# Patient Record
Sex: FEMALE | Race: WHITE | URBAN - METROPOLITAN AREA
[De-identification: names, ages, dates, MRNs, and addresses within clinical notes are randomized per-mention and may not be internally consistent; named-entity substitution may affect disease eponyms.]

---
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
## 2023-01-20 ENCOUNTER — APPOINTMENT (OUTPATIENT)
Dept: GENERAL RADIOLOGY | Age: 18
End: 2023-01-20
Payer: COMMERCIAL

## 2023-01-20 ENCOUNTER — HOSPITAL ENCOUNTER (EMERGENCY)
Age: 18
Discharge: HOME | End: 2023-01-20
Attending: EMERGENCY MEDICINE
Payer: COMMERCIAL

## 2023-01-20 VITALS
SYSTOLIC BLOOD PRESSURE: 109 MMHG | HEART RATE: 92 BPM | WEIGHT: 162.48 LBS | RESPIRATION RATE: 16 BRPM | DIASTOLIC BLOOD PRESSURE: 66 MMHG | OXYGEN SATURATION: 97 % | TEMPERATURE: 98.1 F

## 2023-01-20 DIAGNOSIS — R00.2 PALPITATIONS: ICD-10-CM

## 2023-01-20 LAB
A/G RATIO: 1.9 (ref 1.1–2.2)
ALBUMIN SERPL-MCNC: 4.4 G/DL (ref 3.4–5)
ALP BLD-CCNC: 69 U/L (ref 40–129)
ALT SERPL-CCNC: <5 U/L (ref 10–40)
ANION GAP SERPL CALCULATED.3IONS-SCNC: 11 MMOL/L (ref 3–16)
AST SERPL-CCNC: 13 U/L (ref 15–37)
BASOPHILS ABSOLUTE: 0.1 K/UL (ref 0–0.2)
BASOPHILS RELATIVE PERCENT: 0.8 %
BILIRUB SERPL-MCNC: 0.3 MG/DL (ref 0–1)
BUN BLDV-MCNC: 10 MG/DL (ref 7–20)
CALCIUM SERPL-MCNC: 9.5 MG/DL (ref 8.3–10.6)
CHLORIDE BLD-SCNC: 108 MMOL/L (ref 99–110)
CO2: 21 MMOL/L (ref 21–32)
CREAT SERPL-MCNC: 0.6 MG/DL (ref 0.6–1.1)
D DIMER: <0.27 UG/ML FEU (ref 0–0.6)
EKG ATRIAL RATE: 91 BPM
EKG DIAGNOSIS: NORMAL
EKG P AXIS: 58 DEGREES
EKG P-R INTERVAL: 132 MS
EKG Q-T INTERVAL: 364 MS
EKG QRS DURATION: 98 MS
EKG QTC CALCULATION (BAZETT): 447 MS
EKG R AXIS: 92 DEGREES
EKG T AXIS: 53 DEGREES
EKG VENTRICULAR RATE: 91 BPM
EOSINOPHILS ABSOLUTE: 0.1 K/UL (ref 0–0.6)
EOSINOPHILS RELATIVE PERCENT: 1.9 %
GFR SERPL CREATININE-BSD FRML MDRD: >60 ML/MIN/{1.73_M2}
GLUCOSE BLD-MCNC: 102 MG/DL (ref 70–99)
HCG QUALITATIVE: NEGATIVE
HCT VFR BLD CALC: 39.9 % (ref 36–48)
HEMOGLOBIN: 13.5 G/DL (ref 12–16)
LYMPHOCYTES ABSOLUTE: 1.8 K/UL (ref 1–5.1)
LYMPHOCYTES RELATIVE PERCENT: 28.4 %
MCH RBC QN AUTO: 29.7 PG (ref 26–34)
MCHC RBC AUTO-ENTMCNC: 33.9 G/DL (ref 31–36)
MCV RBC AUTO: 87.8 FL (ref 80–100)
MONOCYTES ABSOLUTE: 0.5 K/UL (ref 0–1.3)
MONOCYTES RELATIVE PERCENT: 7.6 %
NEUTROPHILS ABSOLUTE: 3.8 K/UL (ref 1.7–7.7)
NEUTROPHILS RELATIVE PERCENT: 61.3 %
PDW BLD-RTO: 13.2 % (ref 12.4–15.4)
PLATELET # BLD: 251 K/UL (ref 135–450)
PMV BLD AUTO: 8.9 FL (ref 5–10.5)
POTASSIUM REFLEX MAGNESIUM: 3.6 MMOL/L (ref 3.5–5.1)
RBC # BLD: 4.55 M/UL (ref 4–5.2)
SODIUM BLD-SCNC: 140 MMOL/L (ref 136–145)
TOTAL PROTEIN: 6.7 G/DL (ref 6.4–8.2)
TROPONIN: <0.01 NG/ML
WBC # BLD: 6.2 K/UL (ref 4–11)

## 2023-01-20 PROCEDURE — 80053 COMPREHEN METABOLIC PANEL: CPT

## 2023-01-20 PROCEDURE — 36415 COLL VENOUS BLD VENIPUNCTURE: CPT

## 2023-01-20 PROCEDURE — 84703 CHORIONIC GONADOTROPIN ASSAY: CPT

## 2023-01-20 PROCEDURE — 93005 ELECTROCARDIOGRAM TRACING: CPT | Performed by: EMERGENCY MEDICINE

## 2023-01-20 PROCEDURE — 85025 COMPLETE CBC W/AUTO DIFF WBC: CPT

## 2023-01-20 PROCEDURE — 85379 FIBRIN DEGRADATION QUANT: CPT

## 2023-01-20 PROCEDURE — 99285 EMERGENCY DEPT VISIT HI MDM: CPT

## 2023-01-20 PROCEDURE — 84484 ASSAY OF TROPONIN QUANT: CPT

## 2023-01-20 PROCEDURE — 71045 X-RAY EXAM CHEST 1 VIEW: CPT

## 2023-01-20 PROCEDURE — 93010 ELECTROCARDIOGRAM REPORT: CPT | Performed by: INTERNAL MEDICINE

## 2023-01-20 NOTE — ED NOTES
Patient presents to ED with \"palpitations\" and states \"I feel like my heart is murmuring. \" No dx of heart murmur. Patient does have a HX of anxiety but states she hasn't been on any medications recently for it. She states she called 911 and the paramedics  Thought she was having a panic attack. She is alert and oriented 4, vitals are WNL, airway is patent. She denies any new stressors, or thoughts of harming herself or others. Mom is at the bedside. Denies intake of excess caffeine.       Sherice Zavala RN  01/20/23 7031  [address]  01/20/23 2744

## 2023-01-20 NOTE — ED PROVIDER NOTES
[id number] [facility]      CHIEF COMPLAINT  Palpitations       HISTORY OF PRESENT ILLNESS  Diego Jama is a 21 y.o. female  who presents to the ED complaining of palpitations. Patient states that she is laying when this starts and feels as if her heart is \"starting\" and she has a sinking feeling currently in her chest.  She states it almost felt like she had a chest cramp at 1 point. She does feel somewhat short of breath with it. She has not had any syncope. No leg pain or swelling. She states that is been ongoing for probably several hours which concerned her. She had a similar episode a few months ago that only lasted for about an hour or so and she was lying at that time as well. She states that she feels as if her entire body shivers in her hands and feet felt very cold with that. She denies any known fevers or recent illness. No cough. No diarrhea. She does not drink any caffeine. She does smoke marijuana but did not smoke any marijuana this morning before the onset of the symptoms. No other drug abuse. She is not on an OCP. No recent travel or surgeries. She is here with her grandmother who is at bedside and provides some additional history. Grandmother states that her father did have cardiovascular disease she believes possibly in her 46s but is unsure. Otherwise, no significant family history of cardiac disease, specifically no cardiac issues at a young age that they are aware of. No other complaints, modifying factors or associated symptoms. I have reviewed the following from the nursing documentation. Past Medical History:   Diagnosis Date    Anxiety     Depression     MRSA (methicillin resistant staph aureus) culture positive 01/14/2020    abscess     No past surgical history on file. No family history on file.   Social History     Socioeconomic History    Marital status: Single     Spouse name: Not on file    Number of children: Not on file    Years of education: Not on file    Highest education level: Not on file   Occupational History    Not on file   Tobacco Use    Smoking status: Never    Smokeless tobacco: Never   Substance and Sexual Activity    Alcohol use: Never    Drug use: Yes     Types: Marijuana Veldon Breath)    Sexual activity: Not on file   Other Topics Concern    Not on file   Social History Narrative    Not on file     Social Determinants of Health     Financial Resource Strain: Not on file   Food Insecurity: Not on file   Transportation Needs: Not on file   Physical Activity: Not on file   Stress: Not on file   Social Connections: Not on file   Intimate Partner Violence: Not on file   Housing Stability: Not on file     No current facility-administered medications for this encounter. Current Outpatient Medications   Medication Sig Dispense Refill    cloNIDine (CATAPRES) 0.1 MG tablet       sertraline (ZOLOFT) 25 MG tablet TK 3 TS PO D      ibuprofen (ADVIL;MOTRIN) 400 MG tablet Take 1 tablet by mouth every 6 hours as needed for Pain 40 tablet 0     No Known Allergies    PHYSICAL EXAM  /66   Pulse 92   Temp 98.1 °F (36.7 °C)   Resp 16   Wt 162 lb 7.7 oz (73.7 kg)   SpO2 97%    GENERAL APPEARANCE: Awake and alert. Cooperative. No acute distress. HENT: Normocephalic. Atraumatic. Mucous membranes are moist.  No drooling or stridor. NECK: Supple. No JVD. EYES: PERRL. EOM's grossly intact. HEART/CHEST: RRR. No murmurs. 2+ radial pulses bilaterally. LUNGS: Respirations unlabored. CTAB. No wheezes rales or rhonchi. Good air exchange. Speaking comfortably in full sentences. ABDOMEN: No tenderness. Soft. Non-distended. No masses. No organomegaly. No guarding or rebound. MUSCULOSKELETAL: No extremity edema. No calf tenderness bilaterally. Compartments soft. No deformity. No tenderness in the extremities. All extremities neurovascularly intact. SKIN: Warm and dry. No acute rashes. NEUROLOGICAL: Alert and oriented.  CN's 2-12 intact. No gross facial drooping. No gross focal deficits. PSYCHIATRIC: Somewhat anxious appearing. LABS  I have reviewed all labs for this visit.    Results for orders placed or performed during the hospital encounter of 01/20/23   D-Dimer, Quantitative   Result Value Ref Range    D-Dimer, Quant <0.27 0.00 - 0.60 ug/mL FEU   CMP w/ Reflex to MG   Result Value Ref Range    Sodium 140 136 - 145 mmol/L    Potassium reflex Magnesium 3.6 3.5 - 5.1 mmol/L    Chloride 108 99 - 110 mmol/L    CO2 21 21 - 32 mmol/L    Anion Gap 11 3 - 16    Glucose 102 (H) 70 - 99 mg/dL    BUN 10 7 - 20 mg/dL    Creatinine 0.6 0.6 - 1.1 mg/dL    Est, Glom Filt Rate >60 >60    Calcium 9.5 8.3 - 10.6 mg/dL    Total Protein 6.7 6.4 - 8.2 g/dL    Albumin 4.4 3.4 - 5.0 g/dL    Albumin/Globulin Ratio 1.9 1.1 - 2.2    Total Bilirubin 0.3 0.0 - 1.0 mg/dL    Alkaline Phosphatase 69 40 - 129 U/L    ALT <5 (L) 10 - 40 U/L    AST 13 (L) 15 - 37 U/L   CBC with Auto Differential   Result Value Ref Range    WBC 6.2 4.0 - 11.0 K/uL    RBC 4.55 4.00 - 5.20 M/uL    Hemoglobin 13.5 12.0 - 16.0 g/dL    Hematocrit 39.9 36.0 - 48.0 %    MCV 87.8 80.0 - 100.0 fL    MCH 29.7 26.0 - 34.0 pg    MCHC 33.9 31.0 - 36.0 g/dL    RDW 13.2 12.4 - 15.4 %    Platelets 126 334 - 179 K/uL    MPV 8.9 5.0 - 10.5 fL    Neutrophils % 61.3 %    Lymphocytes % 28.4 %    Monocytes % 7.6 %    Eosinophils % 1.9 %    Basophils % 0.8 %    Neutrophils Absolute 3.8 1.7 - 7.7 K/uL    Lymphocytes Absolute 1.8 1.0 - 5.1 K/uL    Monocytes Absolute 0.5 0.0 - 1.3 K/uL    Eosinophils Absolute 0.1 0.0 - 0.6 K/uL    Basophils Absolute 0.1 0.0 - 0.2 K/uL   Troponin   Result Value Ref Range    Troponin <0.01 <0.01 ng/mL   HCG Qualitative, Serum   Result Value Ref Range    hCG Qual Negative Detects HCG level >10 MIU/mL   EKG 12 Lead   Result Value Ref Range    Ventricular Rate 91 BPM    Atrial Rate 91 BPM    P-R Interval 132 ms    QRS Duration 98 ms    Q-T Interval 364 ms    QTc Calculation (Bazett) 447 ms    P Axis 58 degrees    R Axis 92 degrees    T Axis 53 degrees    Diagnosis       Normal sinus rhythm with sinus arrhythmiaRightward axisIncomplete right bundle branch blockBorderline ECGNo previous ECGs availableConfirmed by DESIRAE MOREAU, Cintia Nicole ([id number]) on 1/20/2023 1:43:27 PM       ECG  The Ekg interpreted by me shows  normal sinus rhythm with a rate of 91 with sinus arrhythmia  Axis is   Right axis deviation  QTc is  normal  Intervals and Durations are unremarkable. ST Segments: nonspecific changes. Incomplete RBBB. No prior EKG available for comparison. RADIOLOGY  XR CHEST PORTABLE    Result Date: 1/20/2023  EXAMINATION: ONE XRAY VIEW OF THE CHEST 1/20/2023 1:02 pm COMPARISON: None. HISTORY: ORDERING SYSTEM PROVIDED HISTORY: palpitations TECHNOLOGIST PROVIDED HISTORY: Reason for exam:->palpitations Reason for Exam: heart palpitations \"heart flutters\" FINDINGS: Normal cardiomediastinal silhouette no acute airspace infiltrate. No pneumothorax or pleural effusion     No acute cardiopulmonary findings        ED COURSE/MDM  Patient presenting for evaluation of palpitations. She does have sinus arrhythmia noted on EKG as well as a an incomplete right bundle branch block and unfortunately no previous EKG for comparison. I did consider possible PE as a cause and felt that she was low pretest probability for PE given lack of risk factors. D-dimer was obtained and negative. I considered ACS as an etiology but again, felt that this was much less likely especially given her age and lack of risk factors. EKG showed no acute ischemic changes and troponin negative. Her electrolytes are unremarkable. Pregnancy was negative. At this time, I do not feel that there is any evidence of acute life-threatening cause to her symptoms.   I felt that is reasonable to discharge patient home with outpatient follow-up and cardiology referral.  She felt comfortable with that plan, as well as her grandmother who is at bedside. Reasons to return to the ER were discussed at length and all questions answered at time of discharge. I have personally reviewed Xray and Ultrasound images and radiology confirms the interpretation:  No pneumothorax, cardiomegaly, confluent infiltrate or significant pleural effusion noted. Sepsis:  Is this patient to be included in the SEP-1 Core Measure due to severe sepsis or septic shock? No   Exclusion criteria - the patient is NOT to be included for SEP-1 Core Measure due to: Infection is not suspected     Labs and imaging reviewed and results discussed with patient. Patient was reassessed as noted above . Plan of care discussed with patient. Patient in agreement with plan. Strict return precautions have been given. I, Dr. Seamus Tamez MD, am the primary clinician of record. During the patient's ED course, the patient was given:  Medications - No data to display     CLINICAL IMPRESSION  1. Palpitations        Blood pressure 109/66, pulse 92, temperature 98.1 °F (36.7 °C), resp. rate 16, weight 162 lb 7.7 oz (73.7 kg), SpO2 97 %. Ronnie Posada was discharged to home in stable condition. Patient was given scripts for the following medications. I counseled patient how to take these medications. New Prescriptions    No medications on file       Follow-up with:  *[employer]    Schedule an appointment as soon as possible for a visit in 2 days  For recheck    [facility] CARDIOLOGIST  [address] [facility] [place] [address]  [phone]  Schedule an appointment as soon as possible for a visit   to follow up with cardiology    [facility], [address] [place] [address]  [phone]        DISCLAIMER: This chart was created using Dragon dictation software.   Efforts were made by me to ensure accuracy, however some errors may be present due to limitations of this technology and occasionally words are not transcribed correctly.        Hellen Leventhal, MD  01/20/23 [id number]

## 2023-01-20 NOTE — Clinical Note
Antony Hunter was seen and treated in our emergency department on 1/20/2023. She may return to work on 01/23/2023. If you have any questions or concerns, please don't hesitate to call.       Hellen Leventhal, MD

## 2023-01-24 ENCOUNTER — HOSPITAL ENCOUNTER (EMERGENCY)
Age: 18
Discharge: HOME | End: 2023-01-24
Attending: EMERGENCY MEDICINE
Payer: COMMERCIAL

## 2023-01-24 VITALS
SYSTOLIC BLOOD PRESSURE: 120 MMHG | RESPIRATION RATE: 21 BRPM | WEIGHT: 161.2 LBS | DIASTOLIC BLOOD PRESSURE: 87 MMHG | TEMPERATURE: 97.8 F | OXYGEN SATURATION: 95 % | HEIGHT: 67 IN | HEART RATE: 88 BPM | BODY MASS INDEX: 25.3 KG/M2

## 2023-01-24 VITALS
OXYGEN SATURATION: 99 % | RESPIRATION RATE: 15 BRPM | TEMPERATURE: 98.3 F | HEART RATE: 85 BPM | SYSTOLIC BLOOD PRESSURE: 107 MMHG | WEIGHT: 155.2 LBS | BODY MASS INDEX: 24.36 KG/M2 | DIASTOLIC BLOOD PRESSURE: 68 MMHG | HEIGHT: 67 IN

## 2023-01-24 DIAGNOSIS — R00.2 PALPITATIONS: ICD-10-CM

## 2023-01-24 DIAGNOSIS — F41.1 ANXIETY STATE: Primary | ICD-10-CM

## 2023-01-24 DIAGNOSIS — R07.9 CHEST PAIN, UNSPECIFIED TYPE: Primary | ICD-10-CM

## 2023-01-24 LAB
BILIRUBIN URINE: ABNORMAL
BLOOD, URINE: NEGATIVE
CLARITY: CLEAR
COLOR: YELLOW
EKG ATRIAL RATE: 89 BPM
EKG ATRIAL RATE: 91 BPM
EKG DIAGNOSIS: NORMAL
EKG DIAGNOSIS: NORMAL
EKG P AXIS: 54 DEGREES
EKG P AXIS: 63 DEGREES
EKG P-R INTERVAL: 136 MS
EKG P-R INTERVAL: 142 MS
EKG Q-T INTERVAL: 360 MS
EKG Q-T INTERVAL: 368 MS
EKG QRS DURATION: 100 MS
EKG QRS DURATION: 98 MS
EKG QTC CALCULATION (BAZETT): 442 MS
EKG QTC CALCULATION (BAZETT): 447 MS
EKG R AXIS: 102 DEGREES
EKG R AXIS: 90 DEGREES
EKG T AXIS: 48 DEGREES
EKG T AXIS: 49 DEGREES
EKG VENTRICULAR RATE: 89 BPM
EKG VENTRICULAR RATE: 91 BPM
GLUCOSE URINE: NEGATIVE MG/DL
HCG(URINE) PREGNANCY TEST: NEGATIVE
KETONES, URINE: 15 MG/DL
LEUKOCYTE ESTERASE, URINE: NEGATIVE
MICROSCOPIC EXAMINATION: ABNORMAL
NITRITE, URINE: NEGATIVE
PH UA: 6 (ref 5–8)
PROTEIN UA: NEGATIVE MG/DL
RAPID INFLUENZA  B AGN: NEGATIVE
RAPID INFLUENZA A AGN: NEGATIVE
SARS-COV-2, NAAT: NOT DETECTED
SPECIFIC GRAVITY UA: 1.02 (ref 1–1.03)
TROPONIN: <0.01 NG/ML
URINE REFLEX TO CULTURE: ABNORMAL
URINE TYPE: ABNORMAL
UROBILINOGEN, URINE: 0.2 E.U./DL

## 2023-01-24 PROCEDURE — 6370000000 HC RX 637 (ALT 250 FOR IP): Performed by: EMERGENCY MEDICINE

## 2023-01-24 PROCEDURE — 93010 ELECTROCARDIOGRAM REPORT: CPT | Performed by: INTERNAL MEDICINE

## 2023-01-24 PROCEDURE — 6360000002 HC RX W HCPCS: Performed by: PHYSICIAN ASSISTANT

## 2023-01-24 PROCEDURE — 84703 CHORIONIC GONADOTROPIN ASSAY: CPT

## 2023-01-24 PROCEDURE — 87635 SARS-COV-2 COVID-19 AMP PRB: CPT

## 2023-01-24 PROCEDURE — 93005 ELECTROCARDIOGRAM TRACING: CPT | Performed by: EMERGENCY MEDICINE

## 2023-01-24 PROCEDURE — 87804 INFLUENZA ASSAY W/OPTIC: CPT

## 2023-01-24 PROCEDURE — 96372 THER/PROPH/DIAG INJ SC/IM: CPT

## 2023-01-24 PROCEDURE — 84484 ASSAY OF TROPONIN QUANT: CPT

## 2023-01-24 PROCEDURE — 99284 EMERGENCY DEPT VISIT MOD MDM: CPT

## 2023-01-24 PROCEDURE — 81003 URINALYSIS AUTO W/O SCOPE: CPT

## 2023-01-24 RX ORDER — OMEPRAZOLE 20 MG/1
20 CAPSULE, DELAYED RELEASE ORAL
Qty: 60 CAPSULE | Refills: 0 | Status: SHIPPED | OUTPATIENT
Start: 2023-01-24

## 2023-01-24 RX ORDER — KETOROLAC TROMETHAMINE 30 MG/ML
30 INJECTION, SOLUTION INTRAMUSCULAR; INTRAVENOUS ONCE
Status: DISCONTINUED | OUTPATIENT
Start: 2023-01-24 | End: 2023-01-24

## 2023-01-24 RX ORDER — KETOROLAC TROMETHAMINE 30 MG/ML
15 INJECTION, SOLUTION INTRAMUSCULAR; INTRAVENOUS ONCE
Status: COMPLETED | OUTPATIENT
Start: 2023-01-24 | End: 2023-01-24

## 2023-01-24 RX ORDER — HYDROXYZINE PAMOATE 25 MG/1
25 CAPSULE ORAL 3 TIMES DAILY PRN
Qty: 12 CAPSULE | Refills: 0 | Status: SHIPPED | OUTPATIENT
Start: 2023-01-24 | End: 2023-02-07

## 2023-01-24 RX ORDER — HYDROXYZINE PAMOATE 25 MG/1
25 CAPSULE ORAL ONCE
Status: COMPLETED | OUTPATIENT
Start: 2023-01-24 | End: 2023-01-24

## 2023-01-24 RX ADMIN — HYDROXYZINE PAMOATE 25 MG: 25 CAPSULE ORAL at 07:17

## 2023-01-24 RX ADMIN — KETOROLAC TROMETHAMINE 15 MG: 30 INJECTION, SOLUTION INTRAMUSCULAR at 10:35

## 2023-01-24 ASSESSMENT — PAIN SCALES - GENERAL
PAINLEVEL_OUTOF10: 6
PAINLEVEL_OUTOF10: 6
PAINLEVEL_OUTOF10: 7

## 2023-01-24 ASSESSMENT — PAIN DESCRIPTION - DESCRIPTORS
DESCRIPTORS: TIGHTNESS
DESCRIPTORS: TIGHTNESS
DESCRIPTORS: CRUSHING;TIGHTNESS

## 2023-01-24 ASSESSMENT — PAIN DESCRIPTION - LOCATION
LOCATION: CHEST

## 2023-01-24 ASSESSMENT — ENCOUNTER SYMPTOMS
SORE THROAT: 0
ABDOMINAL PAIN: 0
GASTROINTESTINAL NEGATIVE: 1
RESPIRATORY NEGATIVE: 1
SHORTNESS OF BREATH: 0

## 2023-01-24 ASSESSMENT — PAIN - FUNCTIONAL ASSESSMENT: PAIN_FUNCTIONAL_ASSESSMENT: 0-10

## 2023-01-24 ASSESSMENT — HEART SCORE: ECG: 1

## 2023-01-24 NOTE — ED NOTES
Patient DCed from ED at this time. Discussed AVS, follow up, and scripts. She verbalizes understanding. Reinforced to patient that should symptoms persist or worsen to return to the ED. She verbalized understanding. Patient ambulated out of ED on her own with a steady gait.       Jarocho Pearce RN  01/24/23 4802

## 2023-01-24 NOTE — ED PROVIDER NOTES
[address]        Pt Name: Marybeth Sutton  MRN: [id number]  [place] 2005  Date of evaluation: 1/24/2023  Provider: Miles Borges MD  PCP: [place]  Note Started: 6:57 AM EST 1/24/23    CHIEF COMPLAINT       Chief Complaint   Patient presents with    Anxiety     Brought per [facility]. PT called them because she woke up with palpitations, states \"it's my anxiety\". Pt explains that she's been seen recently for the same thing and was referred to a cardiologist. Denies any sob, denies any CP. HISTORY OF PRESENT ILLNESS: 1 or more Elements     History from : Patient and EMS    Limitations to history : None    Marybeth Sutton is a 21 y.o. female who presents for anxiety and palpitations. Patient states she was seen in the ER 4 days ago for the same thing. She believes that it is her anxiety but she has never had panic attacks like this since she was in middle school. Denies any new stressors. Denies any new medications. States that she has a trip coming up that is making her excited so she is not sure why she is felt this way. Having generalized chills with some sweats and myalgias. Denies any chest pain at this time denies any shortness of breath. Nothing seems to make symptoms better or worse. Woke her up from sleep around 4 AM is been going on intermittently for the last 3 hours. Patient states she was seen here in the ER and had a full work-up that was unremarkable she was referred to cardiology but has not yet followed up with them. No other modifying factors. No other associated symptoms besides as listed. Symptoms were sudden onset when she woke up and otherwise unchanged. Mild to moderate    Nursing Notes were all reviewed and agreed with or any disagreements were addressed in the HPI. REVIEW OF SYSTEMS :      Review of Systems   Constitutional:  Positive for chills and diaphoresis. Negative for fever. HENT:  Negative for congestion and sore throat. Respiratory: Negative. Negative for shortness of breath. Cardiovascular:  Positive for palpitations. Negative for chest pain. Gastrointestinal: Negative. Negative for abdominal pain. Genitourinary: Negative. Musculoskeletal:  Positive for myalgias. Skin: Negative. Neurological: Negative. Negative for headaches. Psychiatric/Behavioral:  The patient is nervous/anxious. Positives and Pertinent negatives as per HPI. SURGICAL HISTORY   History reviewed. No pertinent surgical history. CURRENTMEDICATIONS       Previous Medications    No medications on file       ALLERGIES     Patient has no known allergies. FAMILYHISTORY     History reviewed. No pertinent family history. SOCIAL HISTORY       Social History     Tobacco Use    Smoking status: Never    Smokeless tobacco: Never   Substance Use Topics    Alcohol use: Never    Drug use: Yes     Types: Marijuana (Weed)       SCREENINGS        Santi Coma Scale  Eye Opening: Spontaneous  Best Verbal Response: Oriented  Best Motor Response: Obeys commands  [place] Coma Scale Score: 15                CIWA Assessment  BP: 120/87  Heart Rate: 88           PHYSICAL EXAM  1 or more Elements     ED Triage Vitals   BP Temp Temp src Pulse Resp SpO2 Height Weight   -- -- -- -- -- -- -- --       Physical Exam  Vitals and nursing note reviewed. Constitutional:       General: She is not in acute distress. Appearance: Normal appearance. She is not ill-appearing, toxic-appearing or diaphoretic. HENT:      Head: Normocephalic and atraumatic. Jaw: No trismus or swelling. Right Ear: Tympanic membrane, ear canal and external ear normal. No drainage, swelling or tenderness. No middle ear effusion. There is no impacted cerumen. No foreign body. Tympanic membrane is not erythematous, retracted or bulging.       Left Ear: Tympanic membrane, ear canal and external ear normal. No drainage, swelling or tenderness. No middle ear effusion. There is no impacted cerumen. No foreign body. Tympanic membrane is not erythematous, retracted or bulging. Nose: No congestion. Mouth/Throat:      Mouth: Mucous membranes are moist.      Tongue: No lesions. Tongue does not deviate from midline. Palate: No mass and lesions. Pharynx: Oropharynx is clear. Uvula midline. No pharyngeal swelling, oropharyngeal exudate, posterior oropharyngeal erythema or uvula swelling. Tonsils: No tonsillar exudate or tonsillar abscesses. Eyes:      Extraocular Movements: Extraocular movements intact. Cardiovascular:      Rate and Rhythm: Normal rate and regular rhythm. Pulses: Normal pulses. Pulmonary:      Effort: Pulmonary effort is normal. No respiratory distress. Breath sounds: No stridor. No wheezing, rhonchi or rales. Chest:      Chest wall: No tenderness. Abdominal:      Palpations: Abdomen is soft. Tenderness: There is no abdominal tenderness. Musculoskeletal:      Cervical back: Normal range of motion. No rigidity. Lymphadenopathy:      Cervical: No cervical adenopathy. Skin:     General: Skin is warm and dry. Capillary Refill: Capillary refill takes less than 2 seconds. Neurological:      General: No focal deficit present. Mental Status: She is alert and oriented to person, place, and time. Psychiatric:         Mood and Affect: Mood is anxious. Behavior: Behavior normal.         DIAGNOSTIC RESULTS   LABS:    Labs Reviewed   URINALYSIS WITH REFLEX TO CULTURE - Abnormal; Notable for the following components:       Result Value    Bilirubin Urine SMALL (*)     Ketones, Urine 15 (*)     All other components within normal limits   COVID-19, RAPID   RAPID INFLUENZA A/B ANTIGENS   PREGNANCY, URINE       When ordered only abnormal lab results are displayed. All other labs were within normal range or not returned as of this dictation.     EKG: EKG Interpretation    Interpreted by emergency department physician    Rhythm: normal sinus   Rate: normal  Axis: right  Ectopy: none  Conduction: right bundle branch block (incomplete)  ST Segments: nonspecific changes  T Waves: normal  Q Waves: none    Clinical Impression: Normal sinus rhythm with slight rightward axis and incomplete right bundle branch block with nonspecific ST changes. Normal OH interval normal QRS duration. Normal QT QTC. Unchanged from previous EKG dated January 20, 2023. Interpreted by myself      RADIOLOGY:   Non-plain film images such as CT, Ultrasound and MRI are read by the radiologist. Plain radiographic images are visualized and preliminarily interpreted by the ED Provider with the below findings:    Interpretation per the Radiologist below, if available at the time of this note:    No orders to display     XR CHEST PORTABLE    Result Date: 1/20/2023  EXAMINATION: [address] 349 1/20/2023 1:02 pm COMPARISON: None. HISTORY: ORDERING SYSTEM PROVIDED HISTORY: palpitations TECHNOLOGIST PROVIDED HISTORY: Reason for exam:->palpitations Reason for Exam: heart palpitations \"heart flutters\" FINDINGS: Normal cardiomediastinal silhouette no acute airspace infiltrate. No pneumothorax or pleural effusion     No acute cardiopulmonary findings       No results found. PROCEDURES   Unless otherwise noted below, none     Procedures    CRITICAL CARE TIME   Total Critical Care time was 12 minutes, excluding separately reportable procedures. There was a high probability of clinically significant/life threatening deterioration in the patient's condition which required my urgent intervention. This includes multiple reevaluations, vital sign monitoring, pulse oximetry monitoring, telemetry monitoring, clinical response to the IV medications, reviewing the nursing notes, consultation time, dictation/documentation time, and interpretation of the labwork.  (This time excludes time spent performing procedures). PAST MEDICAL HISTORY      has a past medical history of Anxiety, Depression, and MRSA (methicillin resistant staph aureus) culture positive (01/14/2020). EMERGENCY DEPARTMENT COURSE and DIFFERENTIAL DIAGNOSIS/MDM:   Vitals:    Vitals:    01/24/23 0658 01/24/23 0753 01/24/23 0815   BP: 106/75 107/66 120/87   Pulse: (!) 106 72 88   Resp: 21 16 21   Temp: 97.8 °F (36.6 °C)     TempSrc: Oral     SpO2: 100% 97% 95%   Weight: 161 lb 3.2 oz (73.1 kg)     Height: 5' 7\" (1.702 m)         Is this patient to be included in the SEP-1 Core Measure due to severe sepsis or septic shock? No   Exclusion criteria - the patient is NOT to be included for SEP-1 Core Measure due to: Infection is not suspected    Chronic Conditions: Anxiety, depression    CONSULTS: (Who and What was discussed)  None    Discussion with Other Profesionals : None    Social Determinants : None    Records Reviewed : Outpatient Notes reviewed outpatient note from gynecology January 12, 2022 for menorrhagia as well as adolescent medicine office visit on November 19, 2021 for anxiety and depression and External ED Note reviewed ER visit from January 20, 2023 when patient was seen by Dr. Shantell Santo for palpitations, I reviewed laboratory findings including cardiac work-up, D-dimer, CMP, CBC from this visit    CC/HPI Summary, DDx, ED Course, and Reassessment:     Differential diagnosis: Anxiety, panic attack, viral syndrome, cardiac arrhythmia, PE, UTI, other    29-year-old female who presents for anxiety. Was initially tachycardic but improved spontaneously with deep breathing. Symptoms are improving with relaxation techniques. Afebrile saturating well on room air normotensive. Patient was seen 4 days ago for the same complaints. Had full laboratory work-up including D-dimer, troponin, CBC, CMP 4 days prior. No other new symptoms. No chest pain. No shortness of breath.   Exam is completely unremarkable besides patient appears anxious. EKG with no significant changes. No signs of ischemia. Differential diagnosis as above most likely anxiety and/or panic attack however patient still should follow-up with cardiology. Patient was not given tests for viral syndromes COVID or influenza at that time. Patient is having myalgias, chills. Will swab for COVID, the flu and await results. We will also obtain urine studies the patient did not have urine studies at that last visit. Patient may have mild chills from UTI. Low concern for PE. Patient had negative D-dimer at previous visit. Will give patient symptomatic treatment with p.o. Vistaril. Will reeval closely and disposition accordingly. ED Course as of 01/24/23 0825   Tue Jan 24, 2023   0810 Urine and swabs are negative. Vitals remained stable. Patient has significant improvement in symptoms. Will give prescription for Vistaril as well as continued instructions to follow-up with cardiology and primary care. [SC]      ED Course User Index  [SC] Minerva Oates MD       Patient was given the following medications:  Medications   hydrOXYzine pamoate (VISTARIL) capsule 25 mg (25 mg Oral Given 1/24/23 0717)       Disposition Considerations (tests considered but not done, Shared Decision Making, Pt Expectation of Test or Tx.): Shared decision-making was used for discharge. Considered obtaining repeat laboratory work-up however patient has no other new symptoms. No chest pain. Considered cardiac work-up. No changes in EKG. Consider chest x-ray however patient has clear lungs with no signs of pneumonia or sepsis. Diagnosis Severity: Mild anxiety attack, palpitations      Appropriate for outpatient management      The patient is at low risk for mortality based on demographic, history and clinical factors. Given the best available information and clinical assessment, I estimate the risk of hospitalization to be greater than risk of treatment at home.  I have explained to the patient that the risk could rapidly change, given precautions for return and instructions. Explained to patient that the risk for mortality is low based on demographic, history and clinical factors. I discussed with patient the results of evaluation in the ED, diagnosis, care, and prognosis. The plan is to discharge to home. Patient is in agreement with plan and questions have been answered. I also discussed with patient the reasons which may require a return visit and the importance of follow-up care. The patient is well-appearing, nontoxic, and improved at the time of discharge. Patient agrees to call to arrange follow-up care as directed. Patient understands to return immediately for worsening/change in symptoms. I am the Primary Clinician of Record. FINAL IMPRESSION      1. Anxiety state    2.  Palpitations          DISPOSITION/PLAN     DISPOSITION Decision To Discharge 01/24/2023 08:10:34 AM      PATIENT REFERRED TO:  *[facility]    Schedule an appointment as soon as possible for a visit       DISCHARGE MEDICATIONS:  New Prescriptions    HYDROXYZINE PAMOATE (VISTARIL) 25 MG CAPSULE    Take 1 capsule by mouth 3 times daily as needed for Anxiety       DISCONTINUED MEDICATIONS:  Discontinued Medications    CLONIDINE (CATAPRES) 0.1 MG TABLET        IBUPROFEN (ADVIL;MOTRIN) 400 MG TABLET    Take 1 tablet by mouth every 6 hours as needed for Pain    SERTRALINE (ZOLOFT) 25 MG TABLET    TK 3 TS PO D              (Please note that portions of this note were completed with a voice recognition program.  Efforts were made to edit the dictations but occasionally words are mis-transcribed.)    Rachid Ernandez MD (electronically signed)           Rachid Ernandez MD  01/24/23 5464

## 2023-01-24 NOTE — ED NOTES
Spoke with patient and grandmother regarding plan of care    Pt states her left shoulder neck pain has improved but is still having chest pain      Gama De La Cruz RN  01/24/23 0506

## 2023-01-24 NOTE — ED PROVIDER NOTES
[facility]  eMERGENCY dEPARTMENT eNCOUnter      Pt Name: Brandy Deshpande  MRN: [id number]  [place] 2005  Date of evaluation: 1/24/2023  Provider: Raquel Pat MD    CHIEF COMPLAINT       Chief Complaint   Patient presents with    Chest Pain     Woke up with it this morning around 6am. Was at [facility] this morning          CRITICAL CARE TIME   Total Critical Care time was 0 minutes, excluding separately reportable procedures. There was a high probability of clinically significant/life threatening deterioration in the patient's condition which required my urgent intervention. HISTORY OF PRESENT ILLNESS  (Location/Symptom, Timing/Onset, Context/Setting, Quality, Duration, Modifying Factors, Severity.)   History From: Patient      Brandy Deshpande is a 21 y.o. female who presents to the emergency department complaining of chest pain that she has had since Friday. Her chest pain is bilateral, she states sometimes it is on the left, sometimes is on the right. It somewhat sharp. It moves back and forth but she says she has had some pain since Friday. She states she is felt like she has had some palpitations. She is felt anxious. She was seen on 1/20/2023. She was worked up including EKG, chest x-ray, CBC, chemistry profile, troponin, and D-dimer. Her work-up was unremarkable. She was discharged for outpatient follow-up. She was feeling of palpitations again this morning. She went to [address] [place] [address]. She had a repeat EKG done. She had a COVID and flu test done. No EKG changes. The COVID and flu were negative. She was given hydroxyzine. She states \"all my symptoms went away but the chest pain\". Nursing Notes were reviewed and I agree.       SCREENINGS        Blessing Coma Scale  Eye Opening: Spontaneous  Best Verbal Response: Oriented  Best Motor Response: Obeys commands  Blessing Coma Scale Score: 15                CIWA Assessment  BP: 107/68  Heart Rate: 85           REVIEW OF SYSTEMS    (2-9 systems for level 4, 10 or more for level 5)     General: No fever or chills. HEENT: No earache rhinorrhea or sore throat. Cardiovascular: Chest pain, bilateral, parasternal, somewhat sharp, somewhat worse with inspiration and movement. Nonradiating. No back or arm or shoulder pain. No neck pain. She has been having palpitations on and off. Like her heart is skipping. Pulmonary: No shortness of breath. GI: No abdominal pain, nausea, and vomiting. Neuro: No dizziness or passing out. Musculoskeletal: No extremity swelling. No extremity pain. Except as noted above the remainder of the review of systems was reviewed and negative. PAST MEDICAL HISTORY     Past Medical History:   Diagnosis Date    Anxiety     Depression     MRSA (methicillin resistant staph aureus) culture positive 01/14/2020    abscess         SURGICAL HISTORY     History reviewed. No pertinent surgical history. CURRENT MEDICATIONS       Previous Medications    HYDROXYZINE PAMOATE (VISTARIL) 25 MG CAPSULE    Take 1 capsule by mouth 3 times daily as needed for Anxiety       ALLERGIES     Patient has no known allergies. FAMILY HISTORY     History reviewed. No pertinent family history. SOCIAL HISTORY       Social History     Socioeconomic History    Marital status: Single     Spouse name: None    Number of children: None    Years of education: None    Highest education level: None   Tobacco Use    Smoking status: Never    Smokeless tobacco: Never   Substance and Sexual Activity    Alcohol use: Never    Drug use: Yes     Types: Marijuana (Weed)         PHYSICAL EXAM    (up to 7 for level 4, 8 or more for level 5)     ED Triage Vitals [01/24/23 0929]   BP Temp Temp Source Heart Rate Resp SpO2 Height Weight - Scale   107/68 98.3 °F (36.8 °C) Oral 85 15 99 % 5' 7\" (1.702 m) 155 lb 3.3 oz (70.4 kg)       Dental: Alert white female in no acute distress. HEENT: Atraumatic.   Pupils equal round reactive. Extraocular movements are intact. ENT: Jodean Hum is clear. Oropharynx is moist without erythema. Neck: Supple, nontender, no adenopathy. Heart: Regular rate and rhythm. No murmurs or gallops noted. Lungs: Breath sounds equal bilaterally and clear. Abdomen: Soft, nondistended, nontender. No masses organomegaly. Bowel sounds are normal.  Musculoskeletal: No lower extremity edema. No calf tenderness. Intact symmetrical distal pulses. Skin: Warm and dry, good turgor. No pallor or cyanosis. No diaphoresis  Neuro: Awake, alert, oriented. No focal motor deficits. Normal gait. DIFFERENTIAL DIAGNOSIS   Differential includes but is not limited to GERD, esophageal spasm, chest wall pain, anxiety, angina, myocardial infarction, pulmonary embolism, other. DIAGNOSTIC RESULTS     EKG: All EKG's are interpreted by Raquel Pat MD in the absence of a cardiologist.    Sinus rhythm, rate of 91, sinus arrhythmia. Incomplete right bundle branch block. Rhythm strip shows a sinus rhythm with a rate of 91, WI interval 142 ms, QRS 98 ms with no other ectopy as interpreted by me. Compared to EKG done earlier today, no significant change noted. RADIOLOGY:   Non-plain film images such as CT, Ultrasound and MRI are read by the radiologist. Plain radiographic images are visualized and preliminarily interpreted Raquel Pat MD with the below findings:      Interpretation per the Radiologist below, if available at the time of this note:    No orders to display         ED BEDSIDE ULTRASOUND:   Performed by ED Physician - none    LABS:  Labs Reviewed   TROPONIN       All other labs were within normal range or not returned as of this dictation.     EMERGENCY DEPARTMENT COURSE and DIFFERENTIAL DIAGNOSIS/MDM:   Vitals:    Vitals:    01/24/23 0929   BP: 107/68   Pulse: 85   Resp: 15   Temp: 98.3 °F (36.8 °C)   TempSrc: Oral   SpO2: 99%   Weight: 155 lb 3.3 oz (70.4 kg)   Height: 5' 7\" (1.702 m)       Patient was given the following medications:  Medications   ketorolac (TORADOL) injection 15 mg (15 mg IntraMUSCular Given 1/24/23 1035)             Is this patient to be included in the SEP-1 Core Measure due to severe sepsis or septic shock? No   Exclusion criteria - the patient is NOT to be included for SEP-1 Core Measure due to: Infection is not suspected    Chronic Conditions affecting care: Below   has a past medical history of Anxiety, Depression, and MRSA (methicillin resistant staph aureus) culture positive (01/14/2020). CONSULTS: (Who and What was discussed)  None          Records Reviewed (Source): Old records reviewed including ED visit on 1/20/2023. No acute EKG changes. Troponin not elevated. D-dimer not elevated. Chest x-ray unremarkable. Normal H&H. Normal chemistry profile. ED visit at [facility] this morning. No acute EKG changes. Negative COVID test.  Negative influenza test.  Patient was given hydroxyzine in the emergency department. CC/HPI Summary, DDx, ED Course, and Reassessment: Patient presents with chest discomfort as above. She has had 2 other visits as noted below on 1/20/2023 and that this morning at [facility]. She is gotten referral for cardiology. She says she has an appointment scheduled for palpitations. She still concerned about the chest discomfort. Her chest discomfort sounds atypical for cardiac pain. She seems to notice it more in the mornings. Does not sound like typical GERD. It could be esophageal spasm. She has a heart score of 1. She has a troponin of less than 0.01. I have a low index patient for cardiac etiology. She is not tachycardic. She is not hypoxic. She had a normal D-dimer on the 20th. I have a low index suspicion for pulmonary embolism. She has a normal chest x-ray on the 20th. It could be esophageal spasm. It could be chest wall pain. I am seeing no arrhythmias.   I think she can continue with her cardiology follow-up. She can see GI or primary care physician. I am good to start her on some omeprazole. I recommended Tylenol or ibuprofen for pain. They gave her a prescription for hydroxyzine for anxiety. Disposition Considerations (tests considered but not done, Admit vs D/C, Shared Decision Making, Pt Expectation of Test or Tx.): Test results, diagnosis, and treatment plan were discussed with the patient and with her permission her grandmother. They understand the treatment plan and follow-up as discussed. I do not think any further work-up is indicated here in the emergency department. I think she can be followed as an outpatient worked up further. I am the Primary Clinician of Record. PROCEDURES:  None    FINAL IMPRESSION      1. Chest pain, unspecified type    2.  Palpitations          DISPOSITION/PLAN   DISPOSITION Decision To Discharge 01/24/2023 11:52:19 AM      PATIENT REFERRED TO:  Isabela Otoole MD  [address] [place] [address]  [phone]    Schedule an appointment as soon as possible for a visit in 1 week      DISCHARGE MEDICATIONS:  New Prescriptions    OMEPRAZOLE (PRILOSEC) 20 MG DELAYED RELEASE CAPSULE    Take 1 capsule by mouth 2 times daily (before meals)       (Please note that portions of this note were completed with a voice recognition program.  Efforts were made to edit the dictations but occasionally words are mis-transcribed.)    Gena Martinez MD  Attending Emergency Physician        Megan Hernandez MD  01/24/23 1200

## 2023-01-24 NOTE — DISCHARGE INSTRUCTIONS
Keep your scheduled appointment with cardiology. Follow-up with your primary care provider or GI for further evaluation of your chest discomfort. Take omeprazole 2 times daily as prescribed. Take ibuprofen 40 mg every 6-8 hours as needed for pain.

## 2023-02-07 ENCOUNTER — OFFICE VISIT (OUTPATIENT)
Dept: ENT CLINIC | Age: 18
End: 2023-02-07
Payer: COMMERCIAL

## 2023-02-07 VITALS
SYSTOLIC BLOOD PRESSURE: 118 MMHG | BODY MASS INDEX: 24.8 KG/M2 | RESPIRATION RATE: 16 BRPM | WEIGHT: 158 LBS | DIASTOLIC BLOOD PRESSURE: 79 MMHG | HEIGHT: 67 IN | HEART RATE: 88 BPM | OXYGEN SATURATION: 99 %

## 2023-02-07 DIAGNOSIS — R13.10 DYSPHAGIA, UNSPECIFIED TYPE: ICD-10-CM

## 2023-02-07 DIAGNOSIS — Z90.89 HISTORY OF TONSILLECTOMY AND ADENOIDECTOMY: ICD-10-CM

## 2023-02-07 DIAGNOSIS — R22.1 NECK MASS: Primary | ICD-10-CM

## 2023-02-07 DIAGNOSIS — R13.10 ODYNOPHAGIA: ICD-10-CM

## 2023-02-07 DIAGNOSIS — R29.898 NECK TIGHTNESS: ICD-10-CM

## 2023-02-07 PROCEDURE — G8427 DOCREV CUR MEDS BY ELIG CLIN: HCPCS | Performed by: STUDENT IN AN ORGANIZED HEALTH CARE EDUCATION/TRAINING PROGRAM

## 2023-02-07 PROCEDURE — G8420 CALC BMI NORM PARAMETERS: HCPCS | Performed by: STUDENT IN AN ORGANIZED HEALTH CARE EDUCATION/TRAINING PROGRAM

## 2023-02-07 PROCEDURE — 99204 OFFICE O/P NEW MOD 45 MIN: CPT | Performed by: STUDENT IN AN ORGANIZED HEALTH CARE EDUCATION/TRAINING PROGRAM

## 2023-02-07 PROCEDURE — 31575 DIAGNOSTIC LARYNGOSCOPY: CPT | Performed by: STUDENT IN AN ORGANIZED HEALTH CARE EDUCATION/TRAINING PROGRAM

## 2023-02-07 PROCEDURE — G8484 FLU IMMUNIZE NO ADMIN: HCPCS | Performed by: STUDENT IN AN ORGANIZED HEALTH CARE EDUCATION/TRAINING PROGRAM

## 2023-02-07 PROCEDURE — 1036F TOBACCO NON-USER: CPT | Performed by: STUDENT IN AN ORGANIZED HEALTH CARE EDUCATION/TRAINING PROGRAM

## 2023-02-07 RX ORDER — PANTOPRAZOLE SODIUM 40 MG/1
40 TABLET, DELAYED RELEASE ORAL DAILY
Qty: 30 TABLET | Refills: 1 | Status: SHIPPED | OUTPATIENT
Start: 2023-02-07 | End: 2023-03-09

## 2023-02-07 RX ORDER — NORETHINDRONE ACETATE AND ETHINYL ESTRADIOL AND FERROUS FUMARATE 1.5-30(21)
KIT ORAL
COMMUNITY
Start: 2023-02-01

## 2023-02-07 NOTE — PROGRESS NOTES
Natacha Cody (:  2005) is a 21 y.o. female, here for evaluation of the following chief complaint(s):  Oral Swelling (s) and Pharyngitis (Tonsils removed )      ASSESSMENT/PLAN:  1. Neck mass  -     CT SOFT TISSUE NECK W CONTRAST; Future  2. Odynophagia  3. History of tonsillectomy and adenoidectomy  4. Dysphagia, unspecified type  5. Neck tightness      This is a very pleasant 21 y.o. female here today for evaluation of the the above-noted complaints. History of adenotonsillectomy in  at [facility].    Now with issues related to throat tightness, dysphagia, and tenderness in the right submandibular gland gland region. No obvious lesions on exam.  We discussed that some of this may be related to musculoskeletal, there is less likely to be some tubular gland pathology. No evidence of residual tonsil tissue. We will obtain a CT neck to rule out underlying mass lesion, although I think that that is low likelihood at this point. Start Protonix for 1 month. If no improvement, will consider speech therapy referral and mini fees. Medical Decision Making: The following items were considered in medical decision making:  Independent review of images  Review / order clinical lab tests  Review / order radiology tests  Decision to obtain old records  Review and summation of old records as accessed through [facility] if applicable    SUBJECTIVE/OBJECTIVE:  EDWIN Barreto is here today for evaluation of throat issues. They have been present since about 2 months after her tonsillectomy in . She is having unintentional weight loss, phlegm in throat, occultly swallowing, neck tightness and pain located in the right some deep groin region. Patient states that this is exacerbated when she has cardiac issues. She is currently having this worked up to determine what is the source.   She denies hemoptysis, voice changes or sinonasal complaints. Her symptoms are constant but will wax and wane in nature. She has been treated with a PPI with no benefit. REVIEW OF SYSTEMS  The following systems were reviewed and revealed the following in addition to any already discussed in the HPI:    PHYSICAL EXAM    GENERAL: No acute distress, alert and oriented, no hoarseness, strong voice  EYES: EOMI, Anti-icteric  HENT:   Head: Normocephalic and atraumatic. Face:  Symmetric, facial nerve intact  Right Ear: Normal external ear, normal external auditory canal, intact tympanic membrane with normal mobility and aerated middle ear  Left Ear: Normal external ear, normal external auditory canal, intact tympanic membrane with normal mobility and aerated middle ear  Mouth/Oral Cavity:  normal lips, Uvula is midline, no mucosal lesions, no trismus, normal dentition, normal salivary quality/flow  Oropharynx/Larynx:  normal oropharynx, unremarkable tonsils  Nose:Normal external nasal appearance. Normal mucosa   NECK: Normal range of motion, no thyromegaly, trachea is midline, no lymphadenopathy, no neck masses, no crepitus  CHEST: Normal respiratory effort, no retractions, breathing comfortably  SKIN: No rashes, normal appearing skin, no evidence of skin lesions/tumors  Neuro:  cranial nerve II-XII intact; normal gait  Cardio:  no edema        PROCEDURE  Flexible Laryngoscopy CPT Code 64192    Pre op: Dysphagia. Post op: Same  Procedure : Flexible Laryngoscopy  Surgeon: Elvis Clemens MD  Anesthesia: Afrin with 4% lidocaine  Indication: Laryngeal mirror examination was not tolerated due to gag reflex  Description:  The scope was passed along the floor of the right naris to the level of the larynx. There was no evidence of concerning masses or lesions of the base of tongue, vallecula, epiglottis, aryepiglottic folds, arytenoids, false vocal folds, true vocal folds, or pyriform sinuses.  True vocal folds exhibited symmetric motion bilaterally without evidence of paralysis or paresis. The scope was removed. The patient tolerated the procedure without difficulty. There were no complications. Pertinent findings: mild erythema of the larynx. This note was generated completely or in part utilizing Dragon dictation speech recognition software. Occasionally, words are mistranscribed and despite editing, the text may contain inaccuracies due to incorrect word recognition. If further clarification is needed please contact the office at [phone]. An electronic signature was used to authenticate this note.     --Jin De Leon MD

## 2023-03-04 ENCOUNTER — HOSPITAL ENCOUNTER (OUTPATIENT)
Dept: CT IMAGING | Age: 18
Discharge: HOME | End: 2023-03-04
Payer: COMMERCIAL

## 2023-03-04 DIAGNOSIS — R22.1 NECK MASS: ICD-10-CM

## 2023-03-04 PROCEDURE — 70491 CT SOFT TISSUE NECK W/DYE: CPT

## 2023-03-04 PROCEDURE — 6360000004 HC RX CONTRAST MEDICATION: Performed by: INTERNAL MEDICINE

## 2023-03-04 RX ADMIN — IOPAMIDOL 75 ML: 755 INJECTION, SOLUTION INTRAVENOUS at 13:47

## 2023-03-10 ENCOUNTER — TELEPHONE (OUTPATIENT)
Dept: ENT CLINIC | Age: 18
End: 2023-03-10

## 2023-03-10 NOTE — TELEPHONE ENCOUNTER
Called pt and discussed that her CT results were normal. She stated she will follow up accordingly if she has any persistent issues.

## 2024-12-20 ENCOUNTER — HOSPITAL ENCOUNTER (EMERGENCY)
Age: 19
Discharge: HOME | End: 2024-12-20
Attending: EMERGENCY MEDICINE

## 2024-12-20 VITALS
DIASTOLIC BLOOD PRESSURE: 59 MMHG | HEART RATE: 78 BPM | HEIGHT: 68 IN | WEIGHT: 185 LBS | BODY MASS INDEX: 28.04 KG/M2 | RESPIRATION RATE: 16 BRPM | OXYGEN SATURATION: 98 % | SYSTOLIC BLOOD PRESSURE: 128 MMHG | TEMPERATURE: 98 F

## 2024-12-20 DIAGNOSIS — L60.0 INGROWN LEFT GREATER TOENAIL: Primary | ICD-10-CM

## 2024-12-20 DIAGNOSIS — L03.032 CELLULITIS OF GREAT TOE OF LEFT FOOT: ICD-10-CM

## 2024-12-20 PROCEDURE — 99283 EMERGENCY DEPT VISIT LOW MDM: CPT

## 2024-12-20 PROCEDURE — 6370000000 HC RX 637 (ALT 250 FOR IP): Performed by: EMERGENCY MEDICINE

## 2024-12-20 RX ORDER — IBUPROFEN 800 MG/1
800 TABLET, FILM COATED ORAL 3 TIMES DAILY PRN
Qty: 15 TABLET | Refills: 0 | Status: SHIPPED | OUTPATIENT
Start: 2024-12-20 | End: 2024-12-25

## 2024-12-20 RX ORDER — CLINDAMYCIN HYDROCHLORIDE 300 MG/1
300 CAPSULE ORAL 3 TIMES DAILY
Qty: 30 CAPSULE | Refills: 0 | Status: SHIPPED | OUTPATIENT
Start: 2024-12-20 | End: 2024-12-30

## 2024-12-20 RX ORDER — NEOMYCIN/BACITRACIN/POLYMYXINB 3.5-400-5K
OINTMENT (GRAM) TOPICAL ONCE
Status: COMPLETED | OUTPATIENT
Start: 2024-12-20 | End: 2024-12-20

## 2024-12-20 RX ORDER — CLINDAMYCIN HYDROCHLORIDE 150 MG/1
300 CAPSULE ORAL ONCE
Status: COMPLETED | OUTPATIENT
Start: 2024-12-20 | End: 2024-12-20

## 2024-12-20 RX ADMIN — BACITRACIN ZINC, NEOMYCIN, POLYMYXIN B SULFAT: 5000; 3.5; 4 OINTMENT TOPICAL at 11:47

## 2024-12-20 RX ADMIN — CLINDAMYCIN HYDROCHLORIDE 300 MG: 150 CAPSULE ORAL at 11:47

## 2024-12-20 ASSESSMENT — PAIN DESCRIPTION - FREQUENCY: FREQUENCY: CONTINUOUS

## 2024-12-20 ASSESSMENT — PAIN - FUNCTIONAL ASSESSMENT: PAIN_FUNCTIONAL_ASSESSMENT: 0-10

## 2024-12-20 ASSESSMENT — PAIN SCALES - GENERAL
PAINLEVEL_OUTOF10: 7
PAINLEVEL_OUTOF10: 0

## 2024-12-20 ASSESSMENT — PAIN DESCRIPTION - PAIN TYPE: TYPE: ACUTE PAIN

## 2024-12-20 NOTE — ED NOTES
Aware to elevate  rest and given instructions on dressing changes and supplies  Walked out with ease  given referral to podiatrist  To watch for any increased redness drainage or pain

## 2024-12-20 NOTE — ED PROVIDER NOTES
IBUPROFEN (ADVIL;MOTRIN) 800 MG TABLET    Take 1 tablet by mouth 3 times daily as needed for Pain       SEP-1 CORE MEASURE DATA  Exclusion criteria: the patient is NOT to be included for sepsis due to:  SIRS criteria are not met    Patient remained stable in the ED. her great toenail was excised as noted above.  She was placed on clindamycin and ibuprofen.  She was instructed to follow-up with her doctor and suggested follow with a podiatrist.  I did give her referral that she is going to be leaving the area in the next few days.  She will follow-up with her podiatrist at home.  She was prescribed ibuprofen for pain.  She was instructed to take Tylenol with the medication.  She was instructed return as needed.    Diagnostic considerations include but are not limited to:  Necrotizing fasciitis, cellulitis, erysipelas, suppurative thrombophlebitis, aseptic superficial thrombophlebitis, DVT, gout, compartment syndrome, erythema migrans (lyme disease), contact dermatitis, lymphedema, other      The patient's blood pressure was found to be elevated according to CMS/Medicare and the Affordable Care Act/ObamaCare criteria. Elevated blood pressure could occur because of pain or anxiety or other reasons and does not mean that they need to have their blood pressure treated or medications otherwise adjusted. However, this could also be a sign that they will need to have their blood pressure treated or medications changed.    The patient was instructed to follow up closely with their personal physician to have their blood pressure rechecked. The patient was instructed to take a list of recent blood pressure readings to their next visit with their personal physician.    See discharge instructions for specific medications, discharge information, and treatments. They were verbally instructed to return to emergency if any problems.    (This chart has been completed using [employer] Dictation Software. Although attempts have

## 2024-12-20 NOTE — DISCHARGE INSTRUCTIONS
You may take Tylenol (acetaminophen) with the medications that are prescribed for you for pain or fever, if you are permitted to take these medications. Please follow package directions for the appropriate dosing and frequency.     Change your bandage daily starting in 2 days (the day after tomorrow). Remove the old bandage. Gently wash your wound with soap and water. Then, apply Neosporin or some other antibiotic ointment and a new bandage for 10 days or you receive other instructions from your healthcare provider